# Patient Record
Sex: FEMALE | Race: WHITE | Employment: OTHER | URBAN - METROPOLITAN AREA
[De-identification: names, ages, dates, MRNs, and addresses within clinical notes are randomized per-mention and may not be internally consistent; named-entity substitution may affect disease eponyms.]

---

## 2017-07-17 ENCOUNTER — OFFICE VISIT (OUTPATIENT)
Dept: DERMATOLOGY | Age: 67
End: 2017-07-17

## 2017-07-17 DIAGNOSIS — D48.5 NEOPLASM OF UNCERTAIN BEHAVIOR OF SKIN: ICD-10-CM

## 2017-07-17 DIAGNOSIS — D22.9 MULTIPLE NEVI: ICD-10-CM

## 2017-07-17 DIAGNOSIS — Z85.820 HISTORY OF MELANOMA: Primary | ICD-10-CM

## 2017-07-17 DIAGNOSIS — Z85.828 HISTORY OF SKIN CANCER: ICD-10-CM

## 2017-07-17 DIAGNOSIS — L57.0 AK (ACTINIC KERATOSIS): ICD-10-CM

## 2017-07-17 PROCEDURE — 17003 DESTRUCT PREMALG LES 2-14: CPT | Performed by: DERMATOLOGY

## 2017-07-17 PROCEDURE — 11100 PR BIOPSY OF SKIN LESION: CPT | Performed by: DERMATOLOGY

## 2017-07-17 PROCEDURE — 17000 DESTRUCT PREMALG LESION: CPT | Performed by: DERMATOLOGY

## 2017-07-17 PROCEDURE — G8427 DOCREV CUR MEDS BY ELIG CLIN: HCPCS | Performed by: DERMATOLOGY

## 2017-07-17 PROCEDURE — G8421 BMI NOT CALCULATED: HCPCS | Performed by: DERMATOLOGY

## 2017-07-17 PROCEDURE — 1036F TOBACCO NON-USER: CPT | Performed by: DERMATOLOGY

## 2017-07-17 PROCEDURE — 1123F ACP DISCUSS/DSCN MKR DOCD: CPT | Performed by: DERMATOLOGY

## 2017-07-17 PROCEDURE — G8400 PT W/DXA NO RESULTS DOC: HCPCS | Performed by: DERMATOLOGY

## 2017-07-17 PROCEDURE — 1090F PRES/ABSN URINE INCON ASSESS: CPT | Performed by: DERMATOLOGY

## 2017-07-17 PROCEDURE — 3014F SCREEN MAMMO DOC REV: CPT | Performed by: DERMATOLOGY

## 2017-07-17 PROCEDURE — 4040F PNEUMOC VAC/ADMIN/RCVD: CPT | Performed by: DERMATOLOGY

## 2017-07-17 PROCEDURE — 3017F COLORECTAL CA SCREEN DOC REV: CPT | Performed by: DERMATOLOGY

## 2017-07-17 PROCEDURE — 99213 OFFICE O/P EST LOW 20 MIN: CPT | Performed by: DERMATOLOGY

## 2017-07-21 ENCOUNTER — TELEPHONE (OUTPATIENT)
Dept: DERMATOLOGY | Age: 67
End: 2017-07-21

## 2017-09-05 ENCOUNTER — TELEPHONE (OUTPATIENT)
Dept: DERMATOLOGY | Age: 67
End: 2017-09-05

## 2017-09-07 ENCOUNTER — OFFICE VISIT (OUTPATIENT)
Dept: DERMATOLOGY | Age: 67
End: 2017-09-07

## 2017-09-07 DIAGNOSIS — B07.0 PLANTAR WART OF LEFT FOOT: Primary | ICD-10-CM

## 2017-09-07 PROCEDURE — 1090F PRES/ABSN URINE INCON ASSESS: CPT | Performed by: DERMATOLOGY

## 2017-09-07 PROCEDURE — 3014F SCREEN MAMMO DOC REV: CPT | Performed by: DERMATOLOGY

## 2017-09-07 PROCEDURE — 1123F ACP DISCUSS/DSCN MKR DOCD: CPT | Performed by: DERMATOLOGY

## 2017-09-07 PROCEDURE — 1036F TOBACCO NON-USER: CPT | Performed by: DERMATOLOGY

## 2017-09-07 PROCEDURE — G8427 DOCREV CUR MEDS BY ELIG CLIN: HCPCS | Performed by: DERMATOLOGY

## 2017-09-07 PROCEDURE — G8400 PT W/DXA NO RESULTS DOC: HCPCS | Performed by: DERMATOLOGY

## 2017-09-07 PROCEDURE — 99212 OFFICE O/P EST SF 10 MIN: CPT | Performed by: DERMATOLOGY

## 2017-09-07 PROCEDURE — G8421 BMI NOT CALCULATED: HCPCS | Performed by: DERMATOLOGY

## 2017-09-07 PROCEDURE — 4040F PNEUMOC VAC/ADMIN/RCVD: CPT | Performed by: DERMATOLOGY

## 2017-09-07 PROCEDURE — 3017F COLORECTAL CA SCREEN DOC REV: CPT | Performed by: DERMATOLOGY

## 2019-09-09 ENCOUNTER — OFFICE VISIT (OUTPATIENT)
Dept: DERMATOLOGY | Age: 69
End: 2019-09-09
Payer: MEDICARE

## 2019-09-09 DIAGNOSIS — D48.5 NEOPLASM OF UNCERTAIN BEHAVIOR OF SKIN: ICD-10-CM

## 2019-09-09 DIAGNOSIS — D22.9 MULTIPLE NEVI: ICD-10-CM

## 2019-09-09 DIAGNOSIS — L57.0 AK (ACTINIC KERATOSIS): ICD-10-CM

## 2019-09-09 DIAGNOSIS — Z85.820 HISTORY OF MELANOMA: Primary | ICD-10-CM

## 2019-09-09 DIAGNOSIS — Z80.8 FAMILY HISTORY OF MALIGNANT MELANOMA: ICD-10-CM

## 2019-09-09 DIAGNOSIS — Z85.828 HISTORY OF NONMELANOMA SKIN CANCER: ICD-10-CM

## 2019-09-09 PROCEDURE — 1036F TOBACCO NON-USER: CPT | Performed by: DERMATOLOGY

## 2019-09-09 PROCEDURE — G8427 DOCREV CUR MEDS BY ELIG CLIN: HCPCS | Performed by: DERMATOLOGY

## 2019-09-09 PROCEDURE — 1123F ACP DISCUSS/DSCN MKR DOCD: CPT | Performed by: DERMATOLOGY

## 2019-09-09 PROCEDURE — G8421 BMI NOT CALCULATED: HCPCS | Performed by: DERMATOLOGY

## 2019-09-09 PROCEDURE — 11102 TANGNTL BX SKIN SINGLE LES: CPT | Performed by: DERMATOLOGY

## 2019-09-09 PROCEDURE — 17003 DESTRUCT PREMALG LES 2-14: CPT | Performed by: DERMATOLOGY

## 2019-09-09 PROCEDURE — 4040F PNEUMOC VAC/ADMIN/RCVD: CPT | Performed by: DERMATOLOGY

## 2019-09-09 PROCEDURE — 17000 DESTRUCT PREMALG LESION: CPT | Performed by: DERMATOLOGY

## 2019-09-09 PROCEDURE — 99213 OFFICE O/P EST LOW 20 MIN: CPT | Performed by: DERMATOLOGY

## 2019-09-09 PROCEDURE — 3017F COLORECTAL CA SCREEN DOC REV: CPT | Performed by: DERMATOLOGY

## 2019-09-09 PROCEDURE — 1090F PRES/ABSN URINE INCON ASSESS: CPT | Performed by: DERMATOLOGY

## 2019-09-09 PROCEDURE — G8400 PT W/DXA NO RESULTS DOC: HCPCS | Performed by: DERMATOLOGY

## 2019-09-09 RX ORDER — UBIDECARENONE 100 MG
300 CAPSULE ORAL DAILY
COMMUNITY

## 2019-09-09 RX ORDER — CLOPIDOGREL BISULFATE 75 MG/1
TABLET ORAL
Refills: 1 | COMMUNITY
Start: 2019-07-03 | End: 2020-09-15

## 2019-09-09 RX ORDER — LOSARTAN POTASSIUM 25 MG/1
TABLET ORAL
COMMUNITY
Start: 2017-12-27 | End: 2019-09-09 | Stop reason: CLARIF

## 2019-09-09 RX ORDER — OXYBUTYNIN CHLORIDE 5 MG/1
TABLET ORAL
Refills: 2 | COMMUNITY
Start: 2019-08-30

## 2019-09-09 RX ORDER — ATORVASTATIN CALCIUM 40 MG/1
40 TABLET, FILM COATED ORAL
COMMUNITY

## 2019-09-09 NOTE — PATIENT INSTRUCTIONS
Biopsy Wound Care Instructions    · Keep the bandage in place for 24 hours. · Cleanse the wound with mild soapy water daily   Gently dry the area.  Apply Vaseline or petroleum jelly to the wound using a cotton tipped applicator.  Cover with a clean bandage.  Repeat this process until the biopsy site is healed.  If you had stitches placed, continue treating the site until the stitches are removed. Remember to make an appointment to return to have your stitches removed by our staff.  You may shower and bathe as usual.       ** Biopsy results generally take around 7 business days to come back. If you have not heard from us by then, please call the office at [phone] between 8AM and 4PM Monday through Friday. Cryosurgery (Freezing) Wound Care Instructions    AFTER THE PROCEDURE:    You will notice swelling and redness around the site. This is normal.    You may experience a sharp or sore feeling for the next several days. For this discomfort, you may take acetaminophen (Tylenol©).  A blister may develop at the treated area, sometimes as soon as by the end of the day. After several days, the blister will subside and a scab will form.  If the area is bumped or traumatized during the first few days following freezing, you may develop bleeding into the blister, forming a blood blister. This is nothing to be alarmed about.  If the blister is tense, uncomfortable, or much larger than the site that was frozen, you may pop the blister along its edge with a sterile needle (boiled, heated under a flame, or cleaned with alcohol) to allow the fluid to drain out. If the blister does not bother you, no treatment is needed.  Do NOT peel off the top of the blister roof. It will act as a dressing on top of your wound. WOUND CARE:    You may shower or bathe as usual, but avoid scrubbing the areas that have been frozen.      Cleanse the site twice a day with mild soapy water, and then apply a

## 2019-09-12 LAB — DERMATOLOGY PATHOLOGY REPORT: ABNORMAL

## 2019-09-25 ENCOUNTER — TELEPHONE (OUTPATIENT)
Dept: DERMATOLOGY | Age: 69
End: 2019-09-25

## 2019-09-26 ENCOUNTER — PROCEDURE VISIT (OUTPATIENT)
Dept: DERMATOLOGY | Age: 69
End: 2019-09-26
Payer: MEDICARE

## 2019-09-26 DIAGNOSIS — C44.519 BASAL CELL CARCINOMA (BCC) OF BACK: Primary | ICD-10-CM

## 2019-09-26 PROCEDURE — 12032 INTMD RPR S/A/T/EXT 2.6-7.5: CPT | Performed by: DERMATOLOGY

## 2019-09-26 PROCEDURE — 11602 EXC TR-EXT MAL+MARG 1.1-2 CM: CPT | Performed by: DERMATOLOGY

## 2019-09-26 NOTE — PROGRESS NOTES
[facility] Dermatology  Vinod Andrews MD  [phone]      Kristy Max  1950    68 y.o. female     Date of Visit: 9/26/2019    Chief Complaint: Basal cell-back  Chief Complaint   Patient presents with    Procedure     Excision: Back-BCC/NT     Last seen: Few weeks ago  *heart stent 3-2019 and on plavix    History of Present Illness:  *living in [facility]. Gracia Smiemmanuel full time now; going back to [facility] Oct 9    Here for excision of a nodule BCC on the back that was biopsied at her last visit a few weeks ago. No problems since then. hx of MM on the posterior neck (see below) -full skin check at her last visit a few weeks ago. Followed with Dr. Belkis Wellington as well but he released her after her 6-2016 visit - had imaging done - PET and MRI negative for mets 6-2016.  + family hx of melanoma (brother and sister)      Dermatology History   hx MM, posterior neck. 4.5 mm at least, level IV, ulceration identified. Deep margin involved, mitotic index 10/mm2. No clinical evidence of regional or distant metastatic disease. Nodes negative, stage IIb. Excised 2-2012 by Dr. Belkis Wellington  3-0197 imaging negative, PET and MRI neg 5-2014    edc of pig BCC on L lower back - 2012  S/p 3 weeks of efudex for HAK with focal SCC in situ on the L cheek   BCC - R ala - Mohs 6-2015    Review of Systems: see other ROS above  Gen: Feels well, good sense of health. Heme: No bleeding problems    Past Medical History, Family History, Surgical History, Medications and Allergies reviewed.     Past Medical History:   Diagnosis Date    Basal cell carcinoma of right alar groove 6/9/2015    History of melanoma     History of poliomyelitis        Past Surgical History:   Procedure Laterality Date    CARDIAC SURGERY      CHOLECYSTECTOMY      SKIN CANCER EXCISION         Outpatient Medications Marked as Taking for the 9/26/19 encounter (Procedure visit) with Graeme Cox MD   Medication Sig Dispense Refill    atorvastatin

## 2019-09-30 LAB — DERMATOLOGY PATHOLOGY REPORT: ABNORMAL

## 2020-09-15 ENCOUNTER — OFFICE VISIT (OUTPATIENT)
Dept: DERMATOLOGY | Age: 70
End: 2020-09-15
Payer: MEDICARE

## 2020-09-15 VITALS — TEMPERATURE: 97.2 F

## 2020-09-15 PROCEDURE — G8427 DOCREV CUR MEDS BY ELIG CLIN: HCPCS | Performed by: DERMATOLOGY

## 2020-09-15 PROCEDURE — 1123F ACP DISCUSS/DSCN MKR DOCD: CPT | Performed by: DERMATOLOGY

## 2020-09-15 PROCEDURE — 11103 TANGNTL BX SKIN EA SEP/ADDL: CPT | Performed by: DERMATOLOGY

## 2020-09-15 PROCEDURE — 11102 TANGNTL BX SKIN SINGLE LES: CPT | Performed by: DERMATOLOGY

## 2020-09-15 PROCEDURE — 11312 SHAVE SKIN LESION 1.1-2.0 CM: CPT | Performed by: DERMATOLOGY

## 2020-09-15 PROCEDURE — G8400 PT W/DXA NO RESULTS DOC: HCPCS | Performed by: DERMATOLOGY

## 2020-09-15 PROCEDURE — G8419 CALC BMI OUT NRM PARAM NOF/U: HCPCS | Performed by: DERMATOLOGY

## 2020-09-15 PROCEDURE — 3017F COLORECTAL CA SCREEN DOC REV: CPT | Performed by: DERMATOLOGY

## 2020-09-15 PROCEDURE — 4040F PNEUMOC VAC/ADMIN/RCVD: CPT | Performed by: DERMATOLOGY

## 2020-09-15 PROCEDURE — 99214 OFFICE O/P EST MOD 30 MIN: CPT | Performed by: DERMATOLOGY

## 2020-09-15 PROCEDURE — 1090F PRES/ABSN URINE INCON ASSESS: CPT | Performed by: DERMATOLOGY

## 2020-09-15 PROCEDURE — 1036F TOBACCO NON-USER: CPT | Performed by: DERMATOLOGY

## 2020-09-15 NOTE — PROGRESS NOTES
[facility] Dermatology  Cata Sarmiento MD  [phone]      Caryle Armor  1950    65 y.o. female     Date of Visit: 9/15/2020    Chief Complaint: f/u melanoma, lesions  Chief Complaint   Patient presents with    Skin Exam     Last seen: 9-2019  *heart stent 3-2019 and on plavix    History of Present Illness:  *living in [facility]. Osbaldo Javed full time now; going back to [facility] Oct 9; sees a derm named Dr. Jennifer Sidhu in [facility]    1. hx of MM on the posterior neck (see below) - here for full skin check; no new lesions or concerns with scar/site; no LAD, no HA, no F/C/NS. Followed with Dr. Viky Rousseau as well but he released her after her 6-2016 visit - had imaging done - PET and MRI negative for mets 6-2016.  + family hx of melanoma (brother and sister)    2. Mult nevi - no other new concerns or changing lesions   3. Hx of NMSC and AK's - no concerns with previous sites. She has a few new rough lesions noted below    4. She has a few tiny scattered rough lesions on the face. Hx of AK's. 5. She has dry itchy skin on the FH and near the brows. Was given a medicatiomn by her [place] derm but never used it and doesn't recall the name. 6. She has a few concerning lesions - L shin, L upper back x 2.    7. She has a brown lesion on the R FH that she notes is growing and can itch. Dermatology History   hx MM, posterior neck. 4.5 mm at least, level IV, ulceration identified. Deep margin involved, mitotic index 10/mm2. No clinical evidence of regional or distant metastatic disease. Nodes negative, stage IIb. Excised 2-2012 by Dr. Viky Rousseau  0-8080 imaging negative, PET and MRI neg 5-2014    edc of pig BCC on L lower back - 2012  S/p 3 weeks of efudex for HAK with focal SCC in situ on the L cheek   BCC - R ala - Mohs 6-2015  BCC - central back, L of midline- excision 9-2019    Review of Systems: see other ROS above  Gen: Feels well, good sense of health. Had lost 30 lbs intentionally (has gained most back).   Skin: No changing moles or lesions   Neuro: no HA  Heme/Lymph: no LAD    Past Medical History, Family History, Surgical History, Medications and Allergies reviewed. Past Medical History:   Diagnosis Date    Basal cell carcinoma of right alar groove 6/9/2015    History of melanoma     History of poliomyelitis        Past Surgical History:   Procedure Laterality Date    CARDIAC SURGERY      CHOLECYSTECTOMY      SKIN CANCER EXCISION         Outpatient Medications Marked as Taking for the 9/15/20 encounter (Office Visit) with Minerva Jacobo MD   Medication Sig Dispense Refill    atorvastatin (LIPITOR) 40 MG tablet Take 40 mg by mouth      oxybutynin (DITROPAN) 5 MG tablet TAKE 1 TABLET BY MOUTH EVERY DAY  2    coenzyme Q10 100 MG CAPS capsule Take 300 mg by mouth daily      aspirin 81 MG tablet Take 81 mg by mouth daily      losartan (COZAAR) 25 MG tablet          Allergies   Allergen Reactions    Celebrex [Celecoxib] Rash     Head to toe rash         Physical Examination     Gen, NAD  The following were examined and determined to be normal: Psych/Neuro, Scalp/hair, Conjunctivae/eyelids, Gums/teeth/lips, Neck, Abdomen, LUE, RLE, Nails/digits and Groin/buttock, lymph, chest, R arm, LLE   The following were examined and determined to be abnormal:  Face, back  1. Scar clear; no palpable LAD; she is dark tan   2. trunk and extremities with scattered brown macules and papules   3. Scars clear  4. Face - with few pinpoint erythematous roughly scaled macules  5. Lower FH/glabella with ill-defined flaky faintly erythematous patches  6. L shin - pinkish-brown waxy macule  L upper back - site A (Lateral) - bright pink sclerotic thin papule  L upper back - site B - (medial) - bright pink macule  7. R FH with dark brown waxy papule    Assessment and Plan     1.  Hx of melanoma, no signs recurrence and family hx of melanoma, no signs of recurrence  - educ re ABCD's of MM   educ and encouraged better sun protection encouraged skin check in 6 mos (sooner if indicated), self checks monthly    2. Benign-appearing nevi  - ed as above    3. Hx of NMSC, no signs recurrence  - ed as above    4. AK's - face - few tiny - deferred trx today    5. Mild seb derm - glabella/lower FH - HC 2.5 bid prn flares; ed se/misuse  6. L shin - r/o SK vs atypical  L upper back - site A (Lateral) - r/o BCC  L upper back - site B - (medial) - r/o BCC  - 3 Shave biopsy performed after verbal consent obtained. Patient educated regarding risk of bleeding, infection, scar and educated on wound care. Skin cleansed with alcohol pad and site anesthetized with lido + epi. Aluminum chloride applied to site for hemostasis. Petrolatum ointment and bandage applied. Specimen bottle labeled with patient information and site and specimen sent to dermpath. 7. R FH - pig SK - 1.2 cm  - Shave removal performed after verbal consent obtained. Patient educated regarding risk of bleeding, infection, scar and educated on wound care. Skin cleansed with alcohol pad and site anesthetized with lido + epi. Aluminum chloride applied to site for hemostasis. Petrolatum ointment and bandage applied. Specimen bottle labeled with patient information and site and specimen sent to dermpath.

## 2020-09-15 NOTE — PROGRESS NOTES
[facility] [facility] time__0720__________        Surgery time____0850________    Take the following medications with a sip of water:     Do not eat or drink anything after 12:00 midnight prior to your surgery. This includes water chewing gum, mints and ice chips. You may brush your teeth and gargle the morning of your surgery, but do not swallow the water     Please see your family doctor/pediatrician for a history and physical and/or concerning medications. 9/15/20 Dr. Alla Zuleta    Bring any test results/reports from your physicians office. If you are under the care of a heart doctor or specialist doctor, please be aware that you may be asked to them for clearance    You may be asked to stop blood thinners such as Coumadin, Plavix, Fragmin, Lovenox, etc., or any anti-inflammatories such as:  Aspirin, Ibuprofen, Advil, Naproxen prior to your surgery. We also ask that you stop any OTC medications such as fish oil, vitamin E, glucosamine, garlic, Multivitamins, COQ 10, etc.    We ask that you do not smoke 24 hours prior to surgery  We ask that you do not  drink any alcoholic beverages 24 hours prior to surgery     You must make arrangements for a responsible adult to take you home after your surgery. For your safety you will not be allowed to leave alone or drive yourself home. Your surgery will be cancelled if you do not have a ride home. Also for your safety, it is strongly suggested that someone stay with you the first 24 hours after your surgery. A parent or legal guardian must accompany a child scheduled for surgery and plan to stay at the hospital until the child is discharged. Please do not bring other children with you. For your comfort, please wear simple loose fitting clothing to the hospital.  Please do not bring valuables. Wear short sleeve button down shirt or loose fitting shirt. Bting eye drops or ointment.     Do not wear any make-up or nail polish on your fingers or toes      For your safety, please do not wear any jewelry or body piercing's on the day of surgery. All jewelry must be removed. If you have dentures, they will be removed before going to operating room. For your convenience, we will provide you with a container. If you wear contact lenses or glasses, they will be removed, please bring a case for them. If you have a living will and a durable power of  for healthcare, please bring in a copy. As part of our patient safety program to minimize surgical site infections, we ask you to do the following:    · Please notify your surgeon if you develop any illness between         now and the  day of your surgery. · This includes a cough, cold, fever, sore throat, nausea,         or vomiting, and diarrhea, etc.  ·  Please notify your surgeon if you experience dizziness, shortness         of breath or blurred vision between now and the time of your surgery. Do not shave your operative site 96 hours prior to surgery. For face and neck surgery, men may use an electric razor 48 hours   prior to surgery. You may shower the night before surgery or the morning of   your surgery with an antibacterial soap. You will need to bring a photo ID and insurance card    [facility] has an onsite pharmacy, would you like to utilize our pharmacy     If you will be staying overnight and use a C-pap machine, please bring   your C-pap to hospital     Our goal is to provide you with excellent care, therefore, visitors will be limited to two(2) in the room at a time so that we may focus on providing this care for you. Please contact pre-admission testing if you have any further questions. [facility] phone number:  [phone]  Please note these are generalized instructions for all surgical cases, you may be provided with more specific instructions according to your surgery.

## 2020-09-15 NOTE — PATIENT INSTRUCTIONS

## 2020-09-18 LAB — DERMATOLOGY PATHOLOGY REPORT: ABNORMAL

## 2020-09-20 ENCOUNTER — PREP FOR PROCEDURE (OUTPATIENT)
Dept: OPHTHALMOLOGY | Age: 70
End: 2020-09-20

## 2020-09-20 RX ORDER — PHENYLEPHRINE HYDROCHLORIDE 25 MG/ML
1 SOLUTION/ DROPS OPHTHALMIC SEE ADMIN INSTRUCTIONS
Status: CANCELLED | OUTPATIENT
Start: 2020-09-20

## 2020-09-20 RX ORDER — CYCLOPENTOLATE HYDROCHLORIDE 10 MG/ML
1 SOLUTION/ DROPS OPHTHALMIC SEE ADMIN INSTRUCTIONS
Status: CANCELLED | OUTPATIENT
Start: 2020-09-20

## 2020-09-20 RX ORDER — TETRACAINE HYDROCHLORIDE 5 MG/ML
1 SOLUTION OPHTHALMIC SEE ADMIN INSTRUCTIONS
Status: CANCELLED | OUTPATIENT
Start: 2020-09-20

## 2020-09-20 RX ORDER — SODIUM CHLORIDE 0.9 % (FLUSH) 0.9 %
10 SYRINGE (ML) INJECTION PRN
Status: CANCELLED | OUTPATIENT
Start: 2020-09-20

## 2020-09-20 RX ORDER — SODIUM CHLORIDE 0.9 % (FLUSH) 0.9 %
10 SYRINGE (ML) INJECTION EVERY 12 HOURS SCHEDULED
Status: CANCELLED | OUTPATIENT
Start: 2020-09-20

## 2020-09-20 RX ORDER — CIPROFLOXACIN HYDROCHLORIDE 3.5 MG/ML
1 SOLUTION/ DROPS TOPICAL SEE ADMIN INSTRUCTIONS
Status: CANCELLED | OUTPATIENT
Start: 2020-09-20

## 2020-09-21 ENCOUNTER — ANESTHESIA EVENT (OUTPATIENT)
Dept: SURGERY | Age: 70
End: 2020-09-21
Payer: MEDICARE

## 2020-09-21 NOTE — PROGRESS NOTES
[address]        Pre-Op Phone Call:     Patient Name: Mindy Palomo     No relevant phone numbers on file. Home phone:  [phone]    Surgery Time:    8:50 AM     Arrival Time:  0720     Left extended Message:  NA     Message left with:     Recent change in health status:  No     Advised of transportation/ policy:  Yes     NPO policy reviewed:  Yes pt     Advised to take morning heart/blood pressure medications with sips of water morning of surgery? Yes     Instructed to bring eye drops, photo identification, and insurance card day of surgery? Yes     Advised to wear short sleeved button down shirt (no T-shirt underneath):  Yes     Advised not to wear jewelry, hairpins, or pantyhose day of surgery? Yes     Advised not to wear make-up and to wash face day of surgery?   Yes    Remarks:        Electronically signed by:  Cain Paul RN at 9/21/2020 2:03 PM

## 2020-09-22 ENCOUNTER — ANESTHESIA (OUTPATIENT)
Dept: SURGERY | Age: 70
End: 2020-09-22
Payer: MEDICARE

## 2020-09-22 ENCOUNTER — HOSPITAL ENCOUNTER (OUTPATIENT)
Age: 70
Setting detail: OUTPATIENT SURGERY
Discharge: HOME | End: 2020-09-22
Attending: OPHTHALMOLOGY | Admitting: OPHTHALMOLOGY
Payer: MEDICARE

## 2020-09-22 VITALS
BODY MASS INDEX: 33.99 KG/M2 | OXYGEN SATURATION: 97 % | DIASTOLIC BLOOD PRESSURE: 66 MMHG | RESPIRATION RATE: 18 BRPM | TEMPERATURE: 96.5 F | WEIGHT: 180 LBS | HEART RATE: 71 BPM | HEIGHT: 61 IN | SYSTOLIC BLOOD PRESSURE: 155 MMHG

## 2020-09-22 VITALS — SYSTOLIC BLOOD PRESSURE: 184 MMHG | OXYGEN SATURATION: 100 % | DIASTOLIC BLOOD PRESSURE: 79 MMHG

## 2020-09-22 PROCEDURE — 2500000003 HC RX 250 WO HCPCS: Performed by: OPHTHALMOLOGY

## 2020-09-22 PROCEDURE — 2580000003 HC RX 258: Performed by: ANESTHESIOLOGY

## 2020-09-22 PROCEDURE — 7100000010 HC PHASE II RECOVERY - FIRST 15 MIN: Performed by: OPHTHALMOLOGY

## 2020-09-22 PROCEDURE — 6360000002 HC RX W HCPCS: Performed by: OPHTHALMOLOGY

## 2020-09-22 PROCEDURE — 3600000004 HC SURGERY LEVEL 4 BASE: Performed by: OPHTHALMOLOGY

## 2020-09-22 PROCEDURE — V2632 POST CHMBR INTRAOCULAR LENS: HCPCS | Performed by: OPHTHALMOLOGY

## 2020-09-22 PROCEDURE — 6360000002 HC RX W HCPCS: Performed by: NURSE ANESTHETIST, CERTIFIED REGISTERED

## 2020-09-22 PROCEDURE — 3600000014 HC SURGERY LEVEL 4 ADDTL 15MIN: Performed by: OPHTHALMOLOGY

## 2020-09-22 PROCEDURE — 6370000000 HC RX 637 (ALT 250 FOR IP): Performed by: OPHTHALMOLOGY

## 2020-09-22 PROCEDURE — 3700000001 HC ADD 15 MINUTES (ANESTHESIA): Performed by: OPHTHALMOLOGY

## 2020-09-22 PROCEDURE — 2720000010 HC SURG SUPPLY STERILE: Performed by: OPHTHALMOLOGY

## 2020-09-22 PROCEDURE — 3700000000 HC ANESTHESIA ATTENDED CARE: Performed by: OPHTHALMOLOGY

## 2020-09-22 PROCEDURE — 2709999900 HC NON-CHARGEABLE SUPPLY: Performed by: OPHTHALMOLOGY

## 2020-09-22 PROCEDURE — 2580000003 HC RX 258: Performed by: NURSE ANESTHETIST, CERTIFIED REGISTERED

## 2020-09-22 DEVICE — IMPLANTABLE DEVICE: Type: IMPLANTABLE DEVICE | Site: EYE | Status: FUNCTIONAL

## 2020-09-22 RX ORDER — TETRACAINE HYDROCHLORIDE 5 MG/ML
1 SOLUTION OPHTHALMIC SEE ADMIN INSTRUCTIONS
Status: DISCONTINUED | OUTPATIENT
Start: 2020-09-22 | End: 2020-09-22 | Stop reason: HOSPADM

## 2020-09-22 RX ORDER — LABETALOL HYDROCHLORIDE 5 MG/ML
5 INJECTION, SOLUTION INTRAVENOUS EVERY 10 MIN PRN
Status: DISCONTINUED | OUTPATIENT
Start: 2020-09-22 | End: 2020-09-22 | Stop reason: HOSPADM

## 2020-09-22 RX ORDER — SODIUM CHLORIDE 9 MG/ML
INJECTION, SOLUTION INTRAVENOUS CONTINUOUS PRN
Status: DISCONTINUED | OUTPATIENT
Start: 2020-09-22 | End: 2020-09-22 | Stop reason: SDUPTHER

## 2020-09-22 RX ORDER — PROMETHAZINE HYDROCHLORIDE 25 MG/ML
6.25 INJECTION, SOLUTION INTRAMUSCULAR; INTRAVENOUS
Status: DISCONTINUED | OUTPATIENT
Start: 2020-09-22 | End: 2020-09-22 | Stop reason: HOSPADM

## 2020-09-22 RX ORDER — ONDANSETRON 2 MG/ML
4 INJECTION INTRAMUSCULAR; INTRAVENOUS
Status: DISCONTINUED | OUTPATIENT
Start: 2020-09-22 | End: 2020-09-22 | Stop reason: HOSPADM

## 2020-09-22 RX ORDER — SODIUM CHLORIDE 9 MG/ML
INJECTION, SOLUTION INTRAVENOUS CONTINUOUS
Status: DISCONTINUED | OUTPATIENT
Start: 2020-09-22 | End: 2020-09-22 | Stop reason: HOSPADM

## 2020-09-22 RX ORDER — CYCLOPENTOLATE HYDROCHLORIDE 10 MG/ML
1 SOLUTION/ DROPS OPHTHALMIC SEE ADMIN INSTRUCTIONS
Status: COMPLETED | OUTPATIENT
Start: 2020-09-22 | End: 2020-09-22

## 2020-09-22 RX ORDER — BRIMONIDINE TARTRATE 2 MG/ML
SOLUTION/ DROPS OPHTHALMIC
Status: COMPLETED | OUTPATIENT
Start: 2020-09-22 | End: 2020-09-22

## 2020-09-22 RX ORDER — SODIUM CHLORIDE 0.9 % (FLUSH) 0.9 %
10 SYRINGE (ML) INJECTION EVERY 12 HOURS SCHEDULED
Status: DISCONTINUED | OUTPATIENT
Start: 2020-09-22 | End: 2020-09-22 | Stop reason: HOSPADM

## 2020-09-22 RX ORDER — SODIUM CHLORIDE 0.9 % (FLUSH) 0.9 %
10 SYRINGE (ML) INJECTION PRN
Status: DISCONTINUED | OUTPATIENT
Start: 2020-09-22 | End: 2020-09-22 | Stop reason: SDUPTHER

## 2020-09-22 RX ORDER — PHENYLEPHRINE HCL 2.5 %
1 DROPS OPHTHALMIC (EYE) SEE ADMIN INSTRUCTIONS
Status: COMPLETED | OUTPATIENT
Start: 2020-09-22 | End: 2020-09-22

## 2020-09-22 RX ORDER — SODIUM CHLORIDE 0.9 % (FLUSH) 0.9 %
10 SYRINGE (ML) INJECTION EVERY 12 HOURS SCHEDULED
Status: DISCONTINUED | OUTPATIENT
Start: 2020-09-22 | End: 2020-09-22 | Stop reason: SDUPTHER

## 2020-09-22 RX ORDER — CIPROFLOXACIN HYDROCHLORIDE 3.5 MG/ML
SOLUTION/ DROPS TOPICAL
Status: COMPLETED | OUTPATIENT
Start: 2020-09-22 | End: 2020-09-22

## 2020-09-22 RX ORDER — MIDAZOLAM HYDROCHLORIDE 1 MG/ML
INJECTION INTRAMUSCULAR; INTRAVENOUS PRN
Status: DISCONTINUED | OUTPATIENT
Start: 2020-09-22 | End: 2020-09-22 | Stop reason: SDUPTHER

## 2020-09-22 RX ORDER — TETRACAINE HYDROCHLORIDE 5 MG/ML
SOLUTION OPHTHALMIC
Status: COMPLETED | OUTPATIENT
Start: 2020-09-22 | End: 2020-09-22

## 2020-09-22 RX ORDER — CIPROFLOXACIN HYDROCHLORIDE 3.5 MG/ML
1 SOLUTION/ DROPS TOPICAL
Status: COMPLETED | OUTPATIENT
Start: 2020-09-22 | End: 2020-09-22

## 2020-09-22 RX ORDER — SODIUM CHLORIDE 0.9 % (FLUSH) 0.9 %
10 SYRINGE (ML) INJECTION PRN
Status: DISCONTINUED | OUTPATIENT
Start: 2020-09-22 | End: 2020-09-22 | Stop reason: HOSPADM

## 2020-09-22 RX ADMIN — TETRACAINE HYDROCHLORIDE 1 DROP: 5 SOLUTION OPHTHALMIC at 07:35

## 2020-09-22 RX ADMIN — SODIUM CHLORIDE: 9 INJECTION, SOLUTION INTRAVENOUS at 07:48

## 2020-09-22 RX ADMIN — CYCLOPENTOLATE HYDROCHLORIDE 1 DROP: 10 SOLUTION/ DROPS OPHTHALMIC at 07:40

## 2020-09-22 RX ADMIN — MIDAZOLAM 1 MG: 1 INJECTION INTRAMUSCULAR; INTRAVENOUS at 09:18

## 2020-09-22 RX ADMIN — PHENYLEPHRINE HYDROCHLORIDE 1 DROP: 25 SOLUTION/ DROPS OPHTHALMIC at 07:40

## 2020-09-22 RX ADMIN — SODIUM CHLORIDE: 9 INJECTION, SOLUTION INTRAVENOUS at 07:31

## 2020-09-22 RX ADMIN — PHENYLEPHRINE HYDROCHLORIDE 1 DROP: 25 SOLUTION/ DROPS OPHTHALMIC at 07:35

## 2020-09-22 RX ADMIN — CYCLOPENTOLATE HYDROCHLORIDE 1 DROP: 10 SOLUTION/ DROPS OPHTHALMIC at 07:35

## 2020-09-22 RX ADMIN — PHENYLEPHRINE HYDROCHLORIDE 1 DROP: 25 SOLUTION/ DROPS OPHTHALMIC at 07:48

## 2020-09-22 RX ADMIN — CIPROFLOXACIN 1 DROP: 3 SOLUTION OPHTHALMIC at 07:35

## 2020-09-22 RX ADMIN — CYCLOPENTOLATE HYDROCHLORIDE 1 DROP: 10 SOLUTION/ DROPS OPHTHALMIC at 07:48

## 2020-09-22 RX ADMIN — MIDAZOLAM 1 MG: 1 INJECTION INTRAMUSCULAR; INTRAVENOUS at 09:16

## 2020-09-22 RX ADMIN — CIPROFLOXACIN 1 DROP: 3 SOLUTION OPHTHALMIC at 07:40

## 2020-09-22 ASSESSMENT — PAIN SCALES - GENERAL
PAINLEVEL_OUTOF10: 0
PAINLEVEL_OUTOF10: 0

## 2020-09-22 ASSESSMENT — PAIN - FUNCTIONAL ASSESSMENT: PAIN_FUNCTIONAL_ASSESSMENT: 0-10

## 2020-09-22 NOTE — ANESTHESIA POSTPROCEDURE EVALUATION
[facility] Department of Anesthesiology  Post-Anesthesia Note       Name:  Rosalba Gee                                  Age:  65 y.o. MRN:  [id number]     Last Vitals & Oxygen Saturation: BP (!) 155/66   Pulse 71   Temp 96.5 °F (35.8 °C) (Temporal)   Resp 18   Ht 5' 1\" (1.549 m)   Wt 180 lb (81.6 kg)   SpO2 97%   BMI 34.01 kg/m²   Patient Vitals for the past 4 hrs:   BP Temp Temp src Pulse Resp SpO2 Height Weight   09/22/20 0951 (!) 155/66 96.5 °F (35.8 °C) Temporal 71 18 97 % -- --   09/22/20 0949 (!) 152/67 97 °F (36.1 °C) Temporal 73 20 -- -- --   09/22/20 0741 (!) 157/59 97.3 °F (36.3 °C) Temporal 76 16 97 % 5' 1\" (1.549 m) 180 lb (81.6 kg)       Level of consciousness:  Awake, alert    Respiratory: Respirations easy, no distress. Stable. Cardiovascular: Hemodynamically stable. Hydration: Adequate. PONV: Adequately managed. Post-op pain: Adequately controlled. Post-op assessment: Tolerated anesthetic well without complication. Complications:  None.     Tito Kendrick MD  September 22, 2020   10:09 AM

## 2020-09-22 NOTE — PROGRESS NOTES
Pt sent home with written d/c instructions, implant card, and tape and shield (per patient request). VSS, pt states that she is ready to go home.

## 2020-09-22 NOTE — OP NOTE
bag. Provisc was removed from posterior to the implant and anterior to the implant by using a Stefan irrigation/aspiration hand piece. 0.2 ml Cefuroxime was placed in the anterior chamber. The corneal incision was checked and the incision was watertight without suture. The wire lid speculum was removed and the patient received topical Ciprofloxacin, and Alphagan P drops. At the conclusion of the procedure, the cornea was clear and the anterior chamber was deep, the pupil was round, and the implant was in good position. The patient tolerated the procedure well and was returned to the recovery room in good condition to be seen for follow-up the next day. ATTENDING ATTESTATION: I was present and scrubbed for the entire procedure.       ELECTRONICALLY SIGNED BY: Saba Vora, 9/22/2020 9:45 AM

## 2020-09-22 NOTE — H&P
Date of Surgery Update:  Saroj An was seen, history and physical examination reviewed, and patient examined by me today. There have been no significant clinical changes since the completion of the previous history and physical.    The risk, benefits, and alternatives of the proposed procedure have been explained to the patient (or appropriate guardian) and understanding verbalized. All questions answered. Patient wishes to proceed.     Electronically signed by: Tessy Herndon MD,9/22/2020,8:21 AM

## 2020-09-22 NOTE — ANESTHESIA PRE PROCEDURE
[facility] Department of Anesthesiology  Pre-Anesthesia Evaluation/Consultation       Name:  Lamar Hernandez  : 1950  Age:  65 y.o. MRN:  [id number]  Date: 2020           Surgeon: Surgeon(s):  Anais Sanchez MD    Procedure: Procedure(s):  LEFT EYE Phacoemulsification with intraocular lens implant     Allergies   Allergen Reactions    Celebrex [Celecoxib] Rash     Head to toe rash     Patient Active Problem List   Diagnosis    Basal cell carcinoma of right alar groove    Visit for wound check    Visit for suture removal     Past Medical History:   Diagnosis Date    Basal cell carcinoma of right alar groove 2015    History of melanoma     History of poliomyelitis     Hyperlipidemia     Hypertension      Past Surgical History:   Procedure Laterality Date    CARDIAC SURGERY      as child     CHOLECYSTECTOMY      CORONARY ANGIOPLASTY WITH STENT PLACEMENT      SKIN CANCER EXCISION       Social History     Tobacco Use    Smoking status: Never Smoker    Smokeless tobacco: Never Used   Substance Use Topics    Alcohol use: Yes    Drug use: No     Medications  No current facility-administered medications on file prior to encounter.       Current Outpatient Medications on File Prior to Encounter   Medication Sig Dispense Refill    atorvastatin (LIPITOR) 40 MG tablet Take 40 mg by mouth      oxybutynin (DITROPAN) 5 MG tablet TAKE 1 TABLET BY MOUTH EVERY DAY  2    coenzyme Q10 100 MG CAPS capsule Take 300 mg by mouth daily      aspirin 81 MG tablet Take 81 mg by mouth daily      losartan (COZAAR) 25 MG tablet Indications: takes at night        Current Facility-Administered Medications   Medication Dose Route Frequency Provider Last Rate Last Dose    0.9 % sodium chloride infusion   Intravenous Continuous Ziyad Hernandez  mL/hr at 20 0748      sodium chloride flush 0.9 % injection 10 mL  10 mL Intravenous 2 times per day Zahida Randle MD        sodium chloride flush 0.9 % injection 10 mL  10 mL Intravenous PRN Zahida Randle MD        tetracaine (TETRAVISC) 0.5 % ophthalmic solution 1 drop  1 drop Left Eye See Admin Instructions Vangie Hussein MD   1 drop at 20 5832     Facility-Administered Medications Ordered in Other Encounters   Medication Dose Route Frequency Provider Last Rate Last Dose    0.9 % sodium chloride infusion    Continuous PRN Yolanda Mcdonnell APRN - CRNA         Vital Signs (Current)   Vitals:    20   BP: (!) 157/59   Pulse: 76   Resp: 16   Temp: 97.3 °F (36.3 °C)   SpO2: 97%     Vital Signs Statistics (for past 48 hrs)     Temp  Av.3 °F (36.3 °C)  Min: 97.3 °F (36.3 °C)   Min taken time: 20  Max: 97.3 °F (36.3 °C)   Max taken time: 20  Pulse  Av  Min: 76   Min taken time: 20  Max: 68   Max taken time: 20  Resp  Av  Min: 16   Min taken time: 20  Max: 16   Max taken time: 20  BP  Min: 157/59   Min taken time: 20  Max: 157/59   Max taken time: 20  SpO2  Av %  Min: 97 %   Min taken time: 20  Max: 97 %   Max taken time: 20    BP Readings from Last 3 Encounters:   20 (!) 157/59   06/30/15 161/79   06/09/15 158/88     BMI  Body mass index is 34.01 kg/m². Estimated body mass index is 34.01 kg/m² as calculated from the following:    Height as of this encounter: 5' 1\" (1.549 m). Weight as of this encounter: 180 lb (81.6 kg). CBC No results found for: WBC, RBC, HGB, HCT, MCV, RDW, PLT  CMP  No results found for: NA, K, CL, CO2, BUN, CREATININE, GFRAA, AGRATIO, LABGLOM, GLUCOSE, PROT, CALCIUM, BILITOT, ALKPHOS, AST, ALT  BMP  No results found for: NA, K, CL, CO2, BUN, CREATININE, CALCIUM, GFRAA, LABGLOM, GLUCOSE  POCGlucose  No results for input(s): GLUCOSE in the last 72 hours.    Coags  No results found for: PROTIME, INR, APTT  HCG (If Applicable) No results found for: PREGTESTUR, PREGSERUM, HCG, HCGQUANT   ABGs No results found for: PHART, PO2ART, HPJ6SON, DNT7VQO, BEART, B8EQSKLE   Type & Screen (If Applicable)  No results found for: LABABO, LABRH                         BMI: Wt Readings from Last 3 Encounters:       NPO Status:   Date of last liquid consumption: 09/21/20   Time of last liquid consumption: 2300   Date of last solid food consumption: 09/21/20      Time of last solid consumption: 2000       Anesthesia Evaluation  Patient summary reviewed no history of anesthetic complications:   Airway: Mallampati: III  TM distance: >3 FB   Neck ROM: full   Dental:          Pulmonary:Negative Pulmonary ROS and normal exam                               Cardiovascular:  Exercise tolerance: good (>4 METS),   (+) hypertension:, CAD:, CABG/stent (Stent 2018):,         Rhythm: regular  Rate: normal           Beta Blocker:  Not on Beta Blocker         Neuro/Psych:   Negative Neuro/Psych ROS              GI/Hepatic/Renal: Neg GI/Hepatic/Renal ROS            Endo/Other: Negative Endo/Other ROS                    Abdominal:   (+) obese,         Vascular: negative vascular ROS. Anesthesia Plan      MAC     ASA 3       Induction: intravenous. Anesthetic plan and risks discussed with patient. Plan discussed with CRNA. This pre-anesthesia assessment may be used as a history and physical.    DOS STAFF ADDENDUM:    Pt seen and examined, chart reviewed (including anesthesia, drug and allergy history). No interval changes to history and physical examination. Anesthetic plan, risks, benefits, alternatives, and personnel involved discussed with patient. Questions and concerns addressed. Patient(family) verbalized an understanding and agrees to proceed.       Melissa Hernandez MD  September 22, 2020  7:57 AM

## 2020-09-23 ENCOUNTER — TELEPHONE (OUTPATIENT)
Dept: DERMATOLOGY | Age: 70
End: 2020-09-23

## 2020-09-23 NOTE — TELEPHONE ENCOUNTER
Patient is returning call she received yesterday w/ biopsy results. Patient can be reached @ [phone]. Please advise. Thank you!

## 2020-10-01 NOTE — PROGRESS NOTES
[facility] [facility] time___0720_________        Surgery time___0850_________    Take the following medications with a sip of water:     Do not eat or drink anything after 12:00 midnight prior to your surgery. This includes water chewing gum, mints and ice chips. You may brush your teeth and gargle the morning of your surgery, but do not swallow the water     Please see your family doctor/pediatrician for a history and physical and/or concerning medications. H&P 9/15 Dr. Sesar Sparks    Bring any test results/reports from your physicians office. If you are under the care of a heart doctor or specialist doctor, please be aware that you may be asked to them for clearance    You may be asked to stop blood thinners such as Coumadin, Plavix, Fragmin, Lovenox, etc., or any anti-inflammatories such as:  Aspirin, Ibuprofen, Advil, Naproxen prior to your surgery. We also ask that you stop any OTC medications such as fish oil, vitamin E, glucosamine, garlic, Multivitamins, COQ 10, etc.    We ask that you do not smoke 24 hours prior to surgery  We ask that you do not  drink any alcoholic beverages 24 hours prior to surgery     You must make arrangements for a responsible adult to take you home after your surgery. For your safety you will not be allowed to leave alone or drive yourself home. Your surgery will be cancelled if you do not have a ride home. Also for your safety, it is strongly suggested that someone stay with you the first 24 hours after your surgery. A parent or legal guardian must accompany a child scheduled for surgery and plan to stay at the hospital until the child is discharged. Please do not bring other children with you. For your comfort, please wear simple loose fitting clothing to the hospital.  Please do not bring valuables. Wear short sleeve button down shirt or loose fitting shirt. Bring eye drops or ointment.     Do not wear any make-up or nail polish on your fingers or toes      For your safety, please do not wear any jewelry or body piercing's on the day of surgery. All jewelry must be removed. If you have dentures, they will be removed before going to operating room. For your convenience, we will provide you with a container. If you wear contact lenses or glasses, they will be removed, please bring a case for them. If you have a living will and a durable power of  for healthcare, please bring in a copy. As part of our patient safety program to minimize surgical site infections, we ask you to do the following:    · Please notify your surgeon if you develop any illness between         now and the  day of your surgery. · This includes a cough, cold, fever, sore throat, nausea,         or vomiting, and diarrhea, etc.  ·  Please notify your surgeon if you experience dizziness, shortness         of breath or blurred vision between now and the time of your surgery. Do not shave your operative site 96 hours prior to surgery. For face and neck surgery, men may use an electric razor 48 hours   prior to surgery. You may shower the night before surgery or the morning of   your surgery with an antibacterial soap. You will need to bring a photo ID and insurance card    [facility] has an onsite pharmacy, would you like to utilize our pharmacy     If you will be staying overnight and use a C-pap machine, please bring   your C-pap to hospital     Our goal is to provide you with excellent care, therefore, visitors will be limited to two(2) in the room at a time so that we may focus on providing this care for you. Please contact pre-admission testing if you have any further questions. [facility] phone number:  [phone]  Please note these are generalized instructions for all surgical cases, you may be provided with more specific instructions according to your surgery.

## 2020-10-13 ENCOUNTER — PROCEDURE VISIT (OUTPATIENT)
Dept: DERMATOLOGY | Age: 70
End: 2020-10-13
Payer: MEDICARE

## 2020-10-13 VITALS — TEMPERATURE: 97.2 F

## 2020-10-13 PROCEDURE — 11602 EXC TR-EXT MAL+MARG 1.1-2 CM: CPT | Performed by: DERMATOLOGY

## 2020-10-13 PROCEDURE — 12032 INTMD RPR S/A/T/EXT 2.6-7.5: CPT | Performed by: DERMATOLOGY

## 2020-10-13 NOTE — PROGRESS NOTES
encounter (Procedure visit) with Ankur Crow MD   Medication Sig Dispense Refill    hydrocortisone 2.5 % cream Apply topically 2 times daily to the FH prn flares 30 g 1    atorvastatin (LIPITOR) 40 MG tablet Take 40 mg by mouth      oxybutynin (DITROPAN) 5 MG tablet TAKE 1 TABLET BY MOUTH EVERY DAY  2    coenzyme Q10 100 MG CAPS capsule Take 300 mg by mouth daily      aspirin 81 MG tablet Take 81 mg by mouth daily      losartan (COZAAR) 25 MG tablet Indications: takes at night          Allergies   Allergen Reactions    Celebrex [Celecoxib] Rash     Head to toe rash         Physical Examination     Gen, NAD     L upper back - site A (Lateral) - scabbed bx site    Assessment and Plan     1. L upper back - site A - nod BCC  - excision today after risks reviewd and pt consented to procedure  educ risk bleed, infxn, scar   area anesth with lido with epi and prepped in sterile manner   ellipse excised to superficial SQ with 2-3 mm margins  - 1.3 cm  specimen to path   edges undermined and hemostasis achieved with electrodessication   wound closed with layered closure 3-0 deep PDS sutures and dermabond  pressure dressing applied   pt educ re wnd care and suture removal     Final repair - 4.1 cm

## 2020-10-15 ENCOUNTER — TELEPHONE (OUTPATIENT)
Dept: DERMATOLOGY | Age: 70
End: 2020-10-15

## 2020-10-15 NOTE — TELEPHONE ENCOUNTER
Dr Kade Rosenbaum patient  Malvin Cool c/b Fred Little stated  Has a discrepancy on a biopsy they received. They have a left vs right issue.  Pt was seen on 10.13.20  Please c/b to discuss

## 2020-10-16 LAB — DERMATOLOGY PATHOLOGY REPORT: ABNORMAL

## 2020-10-20 NOTE — PROGRESS NOTES
[facility] [facility] time___1120_________        Surgery time_____1250_______    Take the following medications with a sip of water:     Do not eat or drink anything after 12:00 midnight prior to your surgery. Clear liquids until 0800  This includes water chewing gum, mints and ice chips. You may brush your teeth and gargle the morning of your surgery, but do not swallow the water     Please see your family doctor/pediatrician for a history and physical and/or concerning medications. H&P 9/15 Dr. Camila Young will see if Dr. Anton Parker will update    Bring any test results/reports from your physicians office. If you are under the care of a heart doctor or specialist doctor, please be aware that you may be asked to them for clearance    You may be asked to stop blood thinners such as Coumadin, Plavix, Fragmin, Lovenox, etc., or any anti-inflammatories such as:  Aspirin, Ibuprofen, Advil, Naproxen prior to your surgery. We also ask that you stop any OTC medications such as fish oil, vitamin E, glucosamine, garlic, Multivitamins, COQ 10, etc.    We ask that you do not smoke 24 hours prior to surgery  We ask that you do not  drink any alcoholic beverages 24 hours prior to surgery     You must make arrangements for a responsible adult to take you home after your surgery. For your safety you will not be allowed to leave alone or drive yourself home. Your surgery will be cancelled if you do not have a ride home. Also for your safety, it is strongly suggested that someone stay with you the first 24 hours after your surgery. A parent or legal guardian must accompany a child scheduled for surgery and plan to stay at the hospital until the child is discharged. Please do not bring other children with you. For your comfort, please wear simple loose fitting clothing to the hospital.  Please do not bring valuables. Wear short sleeve button down shirt or loose fitting shirt. Bring eye drops or ointment. Do not wear any make-up or nail polish on your fingers or toes      For your safety, please do not wear any jewelry or body piercing's on the day of surgery. All jewelry must be removed. If you have dentures, they will be removed before going to operating room. For your convenience, we will provide you with a container. If you wear contact lenses or glasses, they will be removed, please bring a case for them. If you have a living will and a durable power of  for healthcare, please bring in a copy. As part of our patient safety program to minimize surgical site infections, we ask you to do the following:    · Please notify your surgeon if you develop any illness between         now and the  day of your surgery. · This includes a cough, cold, fever, sore throat, nausea,         or vomiting, and diarrhea, etc.  ·  Please notify your surgeon if you experience dizziness, shortness         of breath or blurred vision between now and the time of your surgery. Do not shave your operative site 96 hours prior to surgery. For face and neck surgery, men may use an electric razor 48 hours   prior to surgery. You may shower the night before surgery or the morning of   your surgery with an antibacterial soap. You will need to bring a photo ID and insurance card    [facility] has an onsite pharmacy, would you like to utilize our pharmacy     If you will be staying overnight and use a C-pap machine, please bring   your C-pap to hospital     Our goal is to provide you with excellent care, therefore, visitors will be limited to two(2) in the room at a time so that we may focus on providing this care for you. Please contact pre-admission testing if you have any further questions.                  [facility] phone number:  [phone]    Please note these are generalized instructions for all surgical cases, you may be provided with more specific instructions according to your surgery.

## 2020-10-26 ENCOUNTER — ANESTHESIA EVENT (OUTPATIENT)
Dept: SURGERY | Age: 70
End: 2020-10-26
Payer: MEDICARE

## 2020-10-26 RX ORDER — CYCLOPENTOLATE HYDROCHLORIDE 10 MG/ML
1 SOLUTION/ DROPS OPHTHALMIC SEE ADMIN INSTRUCTIONS
Status: CANCELLED | OUTPATIENT
Start: 2020-10-26

## 2020-10-26 RX ORDER — CIPROFLOXACIN HYDROCHLORIDE 3.5 MG/ML
1 SOLUTION/ DROPS TOPICAL SEE ADMIN INSTRUCTIONS
Status: CANCELLED | OUTPATIENT
Start: 2020-10-26

## 2020-10-26 RX ORDER — PHENYLEPHRINE HYDROCHLORIDE 25 MG/ML
1 SOLUTION/ DROPS OPHTHALMIC SEE ADMIN INSTRUCTIONS
Status: CANCELLED | OUTPATIENT
Start: 2020-10-26

## 2020-10-26 RX ORDER — SODIUM CHLORIDE 0.9 % (FLUSH) 0.9 %
10 SYRINGE (ML) INJECTION EVERY 12 HOURS SCHEDULED
Status: CANCELLED | OUTPATIENT
Start: 2020-10-26

## 2020-10-26 RX ORDER — TETRACAINE HYDROCHLORIDE 5 MG/ML
1 SOLUTION OPHTHALMIC SEE ADMIN INSTRUCTIONS
Status: CANCELLED | OUTPATIENT
Start: 2020-10-26

## 2020-10-26 RX ORDER — SODIUM CHLORIDE 0.9 % (FLUSH) 0.9 %
10 SYRINGE (ML) INJECTION PRN
Status: CANCELLED | OUTPATIENT
Start: 2020-10-26

## 2020-10-26 NOTE — PROGRESS NOTES
[address]        Pre-Op Phone Call:     Patient Name: Rosi Teran     Telephone Information:   Mobile [phone]     Home phone:  [phone]    Surgery Time:   12:50 PM     Arrival Time:  1120     Left extended Message:  NA     Message left with:     Recent change in health status:  No     Advised of transportation/ policy:  Yes     NPO policy reviewed:  Yes clear liquids until 0800     Advised to take morning heart/blood pressure medications with sips of water morning of surgery? Yes     Instructed to bring eye drops, photo identification, and insurance card day of surgery? Yes     Advised to wear short sleeved button down shirt (no T-shirt underneath):  Yes     Advised not to wear jewelry, hairpins, or pantyhose day of surgery? Yes     Advised not to wear make-up and to wash face day of surgery?   Yes    Remarks:        Electronically signed by:  Albertina Raymundo RN at 10/26/2020 2:24 PM

## 2020-10-27 ENCOUNTER — ANESTHESIA (OUTPATIENT)
Dept: SURGERY | Age: 70
End: 2020-10-27
Payer: MEDICARE

## 2020-10-27 ENCOUNTER — HOSPITAL ENCOUNTER (OUTPATIENT)
Age: 70
Setting detail: OUTPATIENT SURGERY
Discharge: HOME | End: 2020-10-27
Attending: OPHTHALMOLOGY | Admitting: OPHTHALMOLOGY
Payer: MEDICARE

## 2020-10-27 VITALS — OXYGEN SATURATION: 100 % | DIASTOLIC BLOOD PRESSURE: 70 MMHG | SYSTOLIC BLOOD PRESSURE: 151 MMHG

## 2020-10-27 VITALS
DIASTOLIC BLOOD PRESSURE: 71 MMHG | WEIGHT: 180 LBS | TEMPERATURE: 97.7 F | HEART RATE: 73 BPM | BODY MASS INDEX: 33.99 KG/M2 | RESPIRATION RATE: 16 BRPM | SYSTOLIC BLOOD PRESSURE: 153 MMHG | HEIGHT: 61 IN | OXYGEN SATURATION: 100 %

## 2020-10-27 PROCEDURE — 6370000000 HC RX 637 (ALT 250 FOR IP): Performed by: OPHTHALMOLOGY

## 2020-10-27 PROCEDURE — 3700000001 HC ADD 15 MINUTES (ANESTHESIA): Performed by: OPHTHALMOLOGY

## 2020-10-27 PROCEDURE — 3600000014 HC SURGERY LEVEL 4 ADDTL 15MIN: Performed by: OPHTHALMOLOGY

## 2020-10-27 PROCEDURE — 2709999900 HC NON-CHARGEABLE SUPPLY: Performed by: OPHTHALMOLOGY

## 2020-10-27 PROCEDURE — 3700000000 HC ANESTHESIA ATTENDED CARE: Performed by: OPHTHALMOLOGY

## 2020-10-27 PROCEDURE — 7100000010 HC PHASE II RECOVERY - FIRST 15 MIN: Performed by: OPHTHALMOLOGY

## 2020-10-27 PROCEDURE — 6360000002 HC RX W HCPCS: Performed by: NURSE ANESTHETIST, CERTIFIED REGISTERED

## 2020-10-27 PROCEDURE — 2720000010 HC SURG SUPPLY STERILE: Performed by: OPHTHALMOLOGY

## 2020-10-27 PROCEDURE — 3600000004 HC SURGERY LEVEL 4 BASE: Performed by: OPHTHALMOLOGY

## 2020-10-27 PROCEDURE — 2580000003 HC RX 258: Performed by: ANESTHESIOLOGY

## 2020-10-27 PROCEDURE — 2500000003 HC RX 250 WO HCPCS: Performed by: OPHTHALMOLOGY

## 2020-10-27 PROCEDURE — 6360000002 HC RX W HCPCS: Performed by: OPHTHALMOLOGY

## 2020-10-27 PROCEDURE — V2632 POST CHMBR INTRAOCULAR LENS: HCPCS | Performed by: OPHTHALMOLOGY

## 2020-10-27 DEVICE — IMPLANTABLE DEVICE: Type: IMPLANTABLE DEVICE | Site: EYE | Status: FUNCTIONAL

## 2020-10-27 RX ORDER — SODIUM CHLORIDE 0.9 % (FLUSH) 0.9 %
10 SYRINGE (ML) INJECTION PRN
Status: DISCONTINUED | OUTPATIENT
Start: 2020-10-27 | End: 2020-10-27 | Stop reason: SDUPTHER

## 2020-10-27 RX ORDER — TETRACAINE HYDROCHLORIDE 5 MG/ML
SOLUTION OPHTHALMIC
Status: COMPLETED | OUTPATIENT
Start: 2020-10-27 | End: 2020-10-27

## 2020-10-27 RX ORDER — CYCLOPENTOLATE HYDROCHLORIDE 10 MG/ML
1 SOLUTION/ DROPS OPHTHALMIC SEE ADMIN INSTRUCTIONS
Status: DISCONTINUED | OUTPATIENT
Start: 2020-10-27 | End: 2020-10-27 | Stop reason: HOSPADM

## 2020-10-27 RX ORDER — SODIUM CHLORIDE 0.9 % (FLUSH) 0.9 %
10 SYRINGE (ML) INJECTION EVERY 12 HOURS SCHEDULED
Status: DISCONTINUED | OUTPATIENT
Start: 2020-10-27 | End: 2020-10-27 | Stop reason: HOSPADM

## 2020-10-27 RX ORDER — ONDANSETRON 2 MG/ML
4 INJECTION INTRAMUSCULAR; INTRAVENOUS
Status: DISCONTINUED | OUTPATIENT
Start: 2020-10-27 | End: 2020-10-27 | Stop reason: HOSPADM

## 2020-10-27 RX ORDER — MIDAZOLAM HYDROCHLORIDE 1 MG/ML
INJECTION INTRAMUSCULAR; INTRAVENOUS PRN
Status: DISCONTINUED | OUTPATIENT
Start: 2020-10-27 | End: 2020-10-27 | Stop reason: SDUPTHER

## 2020-10-27 RX ORDER — BRIMONIDINE TARTRATE 2 MG/ML
SOLUTION/ DROPS OPHTHALMIC
Status: COMPLETED | OUTPATIENT
Start: 2020-10-27 | End: 2020-10-27

## 2020-10-27 RX ORDER — SODIUM CHLORIDE 0.9 % (FLUSH) 0.9 %
10 SYRINGE (ML) INJECTION EVERY 12 HOURS SCHEDULED
Status: DISCONTINUED | OUTPATIENT
Start: 2020-10-27 | End: 2020-10-27 | Stop reason: SDUPTHER

## 2020-10-27 RX ORDER — SODIUM CHLORIDE 9 MG/ML
INJECTION, SOLUTION INTRAVENOUS CONTINUOUS
Status: DISCONTINUED | OUTPATIENT
Start: 2020-10-27 | End: 2020-10-27 | Stop reason: HOSPADM

## 2020-10-27 RX ORDER — OFLOXACIN 3 MG/ML
SOLUTION/ DROPS OPHTHALMIC
Status: COMPLETED | OUTPATIENT
Start: 2020-10-27 | End: 2020-10-27

## 2020-10-27 RX ORDER — TETRACAINE HYDROCHLORIDE 5 MG/ML
1 SOLUTION OPHTHALMIC SEE ADMIN INSTRUCTIONS
Status: DISCONTINUED | OUTPATIENT
Start: 2020-10-27 | End: 2020-10-27 | Stop reason: HOSPADM

## 2020-10-27 RX ORDER — PHENYLEPHRINE HCL 2.5 %
1 DROPS OPHTHALMIC (EYE) SEE ADMIN INSTRUCTIONS
Status: DISCONTINUED | OUTPATIENT
Start: 2020-10-27 | End: 2020-10-27 | Stop reason: HOSPADM

## 2020-10-27 RX ORDER — CIPROFLOXACIN HYDROCHLORIDE 3.5 MG/ML
1 SOLUTION/ DROPS TOPICAL
Status: COMPLETED | OUTPATIENT
Start: 2020-10-27 | End: 2020-10-27

## 2020-10-27 RX ORDER — SODIUM CHLORIDE 0.9 % (FLUSH) 0.9 %
10 SYRINGE (ML) INJECTION PRN
Status: DISCONTINUED | OUTPATIENT
Start: 2020-10-27 | End: 2020-10-27 | Stop reason: HOSPADM

## 2020-10-27 RX ADMIN — CYCLOPENTOLATE HYDROCHLORIDE 1 DROP: 10 SOLUTION/ DROPS OPHTHALMIC at 11:45

## 2020-10-27 RX ADMIN — SODIUM CHLORIDE: 9 INJECTION, SOLUTION INTRAVENOUS at 11:42

## 2020-10-27 RX ADMIN — TETRACAINE HYDROCHLORIDE 1 DROP: 5 SOLUTION OPHTHALMIC at 11:45

## 2020-10-27 RX ADMIN — PHENYLEPHRINE HYDROCHLORIDE 1 DROP: 25 SOLUTION/ DROPS OPHTHALMIC at 11:45

## 2020-10-27 RX ADMIN — CIPROFLOXACIN 1 DROP: 3 SOLUTION OPHTHALMIC at 11:45

## 2020-10-27 RX ADMIN — MIDAZOLAM 2 MG: 1 INJECTION INTRAMUSCULAR; INTRAVENOUS at 13:17

## 2020-10-27 RX ADMIN — CYCLOPENTOLATE HYDROCHLORIDE 1 DROP: 10 SOLUTION/ DROPS OPHTHALMIC at 11:50

## 2020-10-27 RX ADMIN — PHENYLEPHRINE HYDROCHLORIDE 1 DROP: 25 SOLUTION/ DROPS OPHTHALMIC at 11:50

## 2020-10-27 RX ADMIN — SODIUM CHLORIDE: 9 INJECTION, SOLUTION INTRAVENOUS at 11:51

## 2020-10-27 RX ADMIN — CIPROFLOXACIN 1 DROP: 3 SOLUTION OPHTHALMIC at 11:50

## 2020-10-27 ASSESSMENT — PAIN SCALES - GENERAL
PAINLEVEL_OUTOF10: 0
PAINLEVEL_OUTOF10: 0

## 2020-10-27 ASSESSMENT — PAIN - FUNCTIONAL ASSESSMENT: PAIN_FUNCTIONAL_ASSESSMENT: 0-10

## 2020-10-27 NOTE — ANESTHESIA PRE PROCEDURE
[facility] Department of Anesthesiology  Pre-Anesthesia Evaluation/Consultation       Name:  Rosi Teran  : 1950  Age:  65 y.o. MRN:  [id number]  Date: 10/27/2020           Surgeon: Surgeon(s):  Christi Sosa MD    Procedure: Procedure(s):  RIGHT EYE Phacoemulsification with intraocular lens implant     Allergies   Allergen Reactions    Celebrex [Celecoxib] Rash     Head to toe rash     Patient Active Problem List   Diagnosis    Basal cell carcinoma of right alar groove    Visit for wound check    Visit for suture removal     Past Medical History:   Diagnosis Date    Basal cell carcinoma of right alar groove 2015    History of melanoma     History of poliomyelitis     Hyperlipidemia     Hypertension      Past Surgical History:   Procedure Laterality Date    CARDIAC SURGERY      as child     CATARACT REMOVAL WITH IMPLANT Left 2020    CHOLECYSTECTOMY      CORONARY ANGIOPLASTY WITH STENT PLACEMENT      INTRACAPSULAR CATARACT EXTRACTION Left 2020    LEFT EYE Phacoemulsification with intraocular lens implant performed by Christi Sosa MD at [place] [address]       Social History     Tobacco Use    Smoking status: Never Smoker    Smokeless tobacco: Never Used   Substance Use Topics    Alcohol use:  Yes    Drug use: No     Medications  Current Facility-Administered Medications on File Prior to Visit   Medication Dose Route Frequency Provider Last Rate Last Dose    0.9 % sodium chloride infusion   Intravenous Continuous Rik Kruse MD        sodium chloride flush 0.9 % injection 10 mL  10 mL Intravenous 2 times per day Rik Kruse MD        sodium chloride flush 0.9 % injection 10 mL  10 mL Intravenous PRN Rik Kruse MD        ciprofloxacin (CILOXAN) 0.3 % ophthalmic solution 1 drop  1 drop Right Eye Q5 Shruthi Garza MD        cyclopentolate (CYCLOGYL) 1 % ophthalmic solution 1 drop  1 drop Right Eye See Admin Instructions Mirta Henderson MD        phenylephrine (MYDFRIN) 2.5 % ophthalmic solution 1 drop  1 drop Right Eye See Admin Instructions Mirta Henderson MD        tetracaine (TETRAVISC) 0.5 % ophthalmic solution 1 drop  1 drop Right Eye See Admin Instructions Mirta Henderson MD         Current Outpatient Medications on File Prior to Visit   Medication Sig Dispense Refill    hydrocortisone 2.5 % cream Apply topically 2 times daily to the FH prn flares 30 g 1    atorvastatin (LIPITOR) 40 MG tablet Take 40 mg by mouth      oxybutynin (DITROPAN) 5 MG tablet TAKE 1 TABLET BY MOUTH EVERY DAY  2    coenzyme Q10 100 MG CAPS capsule Take 300 mg by mouth daily      aspirin 81 MG tablet Take 81 mg by mouth daily      losartan (COZAAR) 25 MG tablet Indications: takes at night        No current facility-administered medications for this visit. No current outpatient medications on file. Facility-Administered Medications Ordered in Other Visits   Medication Dose Route Frequency Provider Last Rate Last Dose    0.9 % sodium chloride infusion   Intravenous Continuous Nuha Robles MD        sodium chloride flush 0.9 % injection 10 mL  10 mL Intravenous 2 times per day Nuha Robles MD        sodium chloride flush 0.9 % injection 10 mL  10 mL Intravenous PRN Nuha Robles MD        ciprofloxacin (CILOXAN) 0.3 % ophthalmic solution 1 drop  1 drop Right Eye Q5 Min Mirta Henderson MD        cyclopentolate (CYCLOGYL) 1 % ophthalmic solution 1 drop  1 drop Right Eye See Admin Instructions Mirta Henderson MD        phenylephrine (MYDFRIN) 2.5 % ophthalmic solution 1 drop  1 drop Right Eye See Admin Instructions Mirta Henderson MD        tetracaine (TETRAVISC) 0.5 % ophthalmic solution 1 drop  1 drop Right Eye See Admin Instructions Mirta Henderson MD         Vital Signs (Current)   There were no vitals filed for this visit.   Vital Signs Statistics (for past 48 hrs)     No data recorded    BP Readings from Last 3 Encounters:   09/22/20 (!) 184/79   09/22/20 (!) 155/66   06/30/15 161/79     BMI  There is no height or weight on file to calculate BMI. Estimated body mass index is 34.01 kg/m² as calculated from the following:    Height as of 10/20/20: 5' 1\" (1.549 m). Weight as of 10/20/20: 180 lb (81.6 kg). CBC No results found for: WBC, RBC, HGB, HCT, MCV, RDW, PLT  CMP  No results found for: NA, K, CL, CO2, BUN, CREATININE, GFRAA, AGRATIO, LABGLOM, GLUCOSE, PROT, CALCIUM, BILITOT, ALKPHOS, AST, ALT  BMP  No results found for: NA, K, CL, CO2, BUN, CREATININE, CALCIUM, GFRAA, LABGLOM, GLUCOSE  POCGlucose  No results for input(s): GLUCOSE in the last 72 hours. Coags  No results found for: PROTIME, INR, APTT  HCG (If Applicable) No results found for: PREGTESTUR, PREGSERUM, HCG, HCGQUANT   ABGs No results found for: PHART, PO2ART, XBL0WKG, FNF4VMI, BEART, L1HGKVIZ   Type & Screen (If Applicable)  No results found for: LABABO, LABRH                         BMI: Wt Readings from Last 3 Encounters:       NPO Status:                          Anesthesia Evaluation  Patient summary reviewed no history of anesthetic complications:   Airway: Mallampati: III  TM distance: >3 FB   Neck ROM: full   Dental:          Pulmonary:Negative Pulmonary ROS and normal exam                               Cardiovascular:  Exercise tolerance: good (>4 METS),   (+) hypertension:, CAD:, CABG/stent (Stent 2018):, hyperlipidemia        Rhythm: regular  Rate: normal           Beta Blocker:  Not on Beta Blocker         Neuro/Psych:   Negative Neuro/Psych ROS              GI/Hepatic/Renal: Neg GI/Hepatic/Renal ROS            Endo/Other: Negative Endo/Other ROS   (+) malignancy/cancer. Abdominal:   (+) obese,         Vascular: negative vascular ROS. Anesthesia Plan      MAC     ASA 3       Induction: intravenous.     MIPS: Prophylactic antiemetics administered. Anesthetic plan and risks discussed with patient. Plan discussed with CRNA. Attending anesthesiologist reviewed and agrees with Pre Eval content          This pre-anesthesia assessment may be used as a history and physical.    DOS STAFF ADDENDUM:    Pt seen and examined, chart reviewed (including anesthesia, drug and allergy history). No interval changes to history and physical examination. Anesthetic plan, risks, benefits, alternatives, and personnel involved discussed with patient. Questions and concerns addressed. Patient(family) verbalized an understanding and agrees to proceed.       Sanjiv Sorensen MD  October 27, 2020  11:43 AM

## 2020-10-27 NOTE — OP NOTE
[facility]  [address]  [phone]      10/27/2020    Patient name: Amy Farnsworth  YOB: 1950  MRN: [id number]         OPERATIVE REPORT      DATE OF OPERATION: 10/27/2020     SURGEON: Brenden Jim  ASSISTANT(S): None            PREOPERATIVE DIAGNOSIS:  Age-related Nuclear Sclerotic Cataract -right eye  POSTOPERATIVE DIAGNOSIS:  same    OPERATION PERFORMED: Procedure(s):  RIGHT EYE Phacoemulsification with intraocular lens implant -right eye  ANESTHESIA:   Monitor Anesthesia Care  ESTIMATED BLOOD LOSS:  None  COMPLICATIONS:  None  IOL USED:  [employer]ch [employer] Lomb MX60E 23.5    INDICATIONS FOR PROCEDURE:    Best corrected visual acuity of slit lamp confirmation cataract:  20/50 glare  Patient's evaluation of visual status of operative eye:  Decreased vision with reading and night vision times months. DESCRIPTION OF PROCEDURE: Amy Farnsworth, is a 65 y.o. female. After topical  anesthesia and pupillary dilation were obtained, the patient was prepped and draped in the usual sterile fashion for cataract implant surgery. A wire lid speculum was placed and the operating microscope was moved into position over the eye. A paracentesis clear corneal incision was made with a super blade. Approximately 0.5 ml Epi-Shugarcaine was injected into the anterior chamber. This was followed by Viscoat to fill the anterior chamber. A temporal clear corneal incision was made with a 2.75 mm keratome and a bent needle and Utrata forceps were used to perform an anterior capsulorhexis. Hydrodissection was performed on the cataract. Phacoemulsification of the nucleus was performed. Cortex was removed using an automated irrigation/aspiration hand piece and vacuuming of the posterior capsule was also carried out with the same hand piece on minimal settings.  Provisc was used to re-inflate the capsular bag and a foldable intraocular lens was placed into the capsular bag. Provisc was removed from posterior to the implant and anterior to the implant by using a Stefan irrigation/aspiration hand piece. 0.2 ml Cefuroxime was placed in the anterior chamber. The corneal incision was checked and the incision was watertight without suture. The wire lid speculum was removed and the patient received topical Ciprofloxacin, and Alphagan P drops. At the conclusion of the procedure, the cornea was clear and the anterior chamber was deep, the pupil was round, and the implant was in good position. The patient tolerated the procedure well and was returned to the recovery room in good condition to be seen for follow-up the next day. ATTENDING ATTESTATION: I was present and scrubbed for the entire procedure.       ELECTRONICALLY SIGNED BY: Vladislav Grayson, 10/27/2020 1:43 PM

## 2020-10-27 NOTE — H&P
Date of Surgery Update:  Анна Reyes was seen, history and physical examination reviewed, and patient examined by me today. There have been no significant clinical changes since the completion of the previous history and physical.    The risk, benefits, and alternatives of the proposed procedure have been explained to the patient (or appropriate guardian) and understanding verbalized. All questions answered. Patient wishes to proceed.     Electronically signed by: Zaira Fletcher MD,10/27/2020,12:26 PM

## 2021-08-30 ENCOUNTER — TELEPHONE (OUTPATIENT)
Dept: DERMATOLOGY | Age: 71
End: 2021-08-30

## 2021-08-30 NOTE — TELEPHONE ENCOUNTER
Patient is requesting a return call to schedule an appt for both herself and her  Venita Krishnamurthy in the first part of October. Patient is going to [facility] middle of October. Please advise. Thank you!

## 2021-10-11 ENCOUNTER — OFFICE VISIT (OUTPATIENT)
Dept: DERMATOLOGY | Age: 71
End: 2021-10-11
Payer: MEDICARE

## 2021-10-11 VITALS — TEMPERATURE: 97 F

## 2021-10-11 DIAGNOSIS — D22.9 MULTIPLE NEVI: ICD-10-CM

## 2021-10-11 DIAGNOSIS — Z85.828 HISTORY OF NONMELANOMA SKIN CANCER: ICD-10-CM

## 2021-10-11 DIAGNOSIS — L82.1 SEBORRHEIC KERATOSIS: ICD-10-CM

## 2021-10-11 DIAGNOSIS — L21.9 SEBORRHEIC DERMATITIS: ICD-10-CM

## 2021-10-11 DIAGNOSIS — L57.0 AK (ACTINIC KERATOSIS): ICD-10-CM

## 2021-10-11 DIAGNOSIS — Z80.8 FAMILY HISTORY OF MALIGNANT MELANOMA: ICD-10-CM

## 2021-10-11 DIAGNOSIS — Z85.820 HISTORY OF MELANOMA: Primary | ICD-10-CM

## 2021-10-11 PROCEDURE — 1123F ACP DISCUSS/DSCN MKR DOCD: CPT | Performed by: DERMATOLOGY

## 2021-10-11 PROCEDURE — G8421 BMI NOT CALCULATED: HCPCS | Performed by: DERMATOLOGY

## 2021-10-11 PROCEDURE — G8427 DOCREV CUR MEDS BY ELIG CLIN: HCPCS | Performed by: DERMATOLOGY

## 2021-10-11 PROCEDURE — 1090F PRES/ABSN URINE INCON ASSESS: CPT | Performed by: DERMATOLOGY

## 2021-10-11 PROCEDURE — 17004 DESTROY PREMAL LESIONS 15/>: CPT | Performed by: DERMATOLOGY

## 2021-10-11 PROCEDURE — 99213 OFFICE O/P EST LOW 20 MIN: CPT | Performed by: DERMATOLOGY

## 2021-10-11 PROCEDURE — G8400 PT W/DXA NO RESULTS DOC: HCPCS | Performed by: DERMATOLOGY

## 2021-10-11 PROCEDURE — 4040F PNEUMOC VAC/ADMIN/RCVD: CPT | Performed by: DERMATOLOGY

## 2021-10-11 PROCEDURE — 3017F COLORECTAL CA SCREEN DOC REV: CPT | Performed by: DERMATOLOGY

## 2021-10-11 PROCEDURE — G8484 FLU IMMUNIZE NO ADMIN: HCPCS | Performed by: DERMATOLOGY

## 2021-10-11 PROCEDURE — 1036F TOBACCO NON-USER: CPT | Performed by: DERMATOLOGY

## 2021-10-11 NOTE — PROGRESS NOTES
[facility]  Cecily Reddy MD  [phone]      Baudilio Bonilla  1950    66 y.o. female     Date of Visit: 10/11/2021    Chief Complaint: f/u melanoma, lesions  Chief Complaint   Patient presents with    Skin Exam     few areas on face      Last seen: 9-2020/    *heart stent 3-2019 and on plavix    History of Present Illness:  *living in [facility]. Andrew Ortega full time now; going back to [facility] Oct 9; sees a derm named Dr. Norma West in [facility]    1. hx of MM on the posterior neck (see below) - here for full skin check; no new lesions or concerns with scar/site; no LAD, no HA, no F/C/NS. Followed with Dr. Morelia Benz as well but he released her after her 6-2016 visit - had imaging done - PET and MRI negative for mets 6-2016.  + family hx of melanoma (brother and sister)    2. Mult nevi - no other new concerns or changing lesions   3. Hx of NMSC - no concerns with previous sites. Most recently - L upper back - site A - nod BCC- excision     4. She has scattered rough lesions on the face. Hx of AK's.    5. F/u seb derm - dry itchy skin on the FH and near the brows. Was given HC 2.5 in the past but hasn't used anything recently. Most of her pruritic areas today are AK's. Status post shave removal of pigmented SK on the right forehead at 2020 visit. Dermatology History   hx MM, posterior neck. 4.5 mm at least, level IV, ulceration identified. Deep margin involved, mitotic index 10/mm2. No clinical evidence of regional or distant metastatic disease. Nodes negative, stage IIb. Excised 2-2012 by Dr. Morelia Benz  2-1606 imaging negative, PET and MRI neg 5-2014    edc of pig BCC on L lower back - 2012  S/p 3 weeks of efudex for HAK with focal SCC in situ on the L cheek   BCC - R ala - Mohs 6-2015  BCC - central back, L of midline- excision 9-2019    Review of Systems: see other ROS above  Gen: Feels well, good sense of health. Had lost 30 lbs intentionally (has gained most back).   Skin: No changing moles or lesions   Neuro: no HA  Heme/Lymph: no LAD    Past Medical History, Family History, Surgical History, Medications and Allergies reviewed. Past Medical History:   Diagnosis Date    Basal cell carcinoma of right alar groove 6/9/2015    History of melanoma     History of poliomyelitis     Hyperlipidemia     Hypertension        Past Surgical History:   Procedure Laterality Date    CARDIAC SURGERY      as child     CATARACT REMOVAL WITH IMPLANT Left 09/22/2020    CHOLECYSTECTOMY      CORONARY ANGIOPLASTY WITH STENT PLACEMENT      INTRACAPSULAR CATARACT EXTRACTION Left 9/22/2020    LEFT EYE Phacoemulsification with intraocular lens implant performed by Derrick Hartman MD at [facility] Right 10/27/2020    RIGHT EYE Phacoemulsification with intraocular lens implant performed by Derrick Hartman MD at [facility]         Outpatient Medications Marked as Taking for the 10/11/21 encounter (Office Visit) with Carolyn Maurer MD   Medication Sig Dispense Refill    hydrocortisone 2.5 % cream Apply topically 2 times daily to the FH prn flares 30 g 1    atorvastatin (LIPITOR) 40 MG tablet Take 40 mg by mouth      oxybutynin (DITROPAN) 5 MG tablet TAKE 1 TABLET BY MOUTH EVERY DAY  2    aspirin 81 MG tablet Take 81 mg by mouth daily      losartan (COZAAR) 25 MG tablet Indications: takes at night          Allergies   Allergen Reactions    Celebrex [Celecoxib] Rash     Head to toe rash         Physical Examination     Gen, NAD  FSE today     1. Scar clear; no palpable LAD; she is dark tan   2. trunk and extremities with scattered brown macules and papules   3. Scars clear  4. Forehead, cheeks and temples with roughly scaled erythematous macules  5. Lower FH/glabella with ill-defined mildly flaky skin    Assessment and Plan     1.  Hx of melanoma, no signs recurrence and family hx of melanoma, no signs of recurrence  - educ re ABCD's of MM   educ and encouraged better sun protection SPF 30+  encouraged skin check in 6 mos (sooner if indicated), self checks monthly    2. Benign-appearing nevi  - ed as above    3. Hx of NMSC, no signs recurrence  - ed as above    4. AK's - face -multiple new  - 15 lesion(s) on the face treated with liquid nitrogen x 2 cycles. Patient educated on risk of blister, hypopigmentation/scar and wound care. -Continue sun protection  -discussed need for biopsy if persistent for several lesions on the forehead and cheeks    5. Mild seb derm - glabella/lower FH -stable  - HC 2.5 bid prn flares; ed se/misuse    F/u 6 mos with [place] derm and in 1 year here.

## 2022-09-06 ENCOUNTER — OFFICE VISIT (OUTPATIENT)
Dept: DERMATOLOGY | Age: 72
End: 2022-09-06
Payer: MEDICARE

## 2022-09-06 DIAGNOSIS — L21.9 SEBORRHEIC DERMATITIS: ICD-10-CM

## 2022-09-06 DIAGNOSIS — D48.5 NEOPLASM OF UNCERTAIN BEHAVIOR OF SKIN: ICD-10-CM

## 2022-09-06 DIAGNOSIS — Z85.820 HISTORY OF MELANOMA: Primary | ICD-10-CM

## 2022-09-06 DIAGNOSIS — D22.9 MULTIPLE NEVI: ICD-10-CM

## 2022-09-06 DIAGNOSIS — Z85.828 HISTORY OF NONMELANOMA SKIN CANCER: ICD-10-CM

## 2022-09-06 DIAGNOSIS — Z80.8 FAMILY HISTORY OF MALIGNANT MELANOMA: ICD-10-CM

## 2022-09-06 DIAGNOSIS — L57.0 AK (ACTINIC KERATOSIS): ICD-10-CM

## 2022-09-06 DIAGNOSIS — L81.4 LENTIGINES: ICD-10-CM

## 2022-09-06 PROCEDURE — 1123F ACP DISCUSS/DSCN MKR DOCD: CPT | Performed by: DERMATOLOGY

## 2022-09-06 PROCEDURE — 17004 DESTROY PREMAL LESIONS 15/>: CPT | Performed by: DERMATOLOGY

## 2022-09-06 PROCEDURE — 11102 TANGNTL BX SKIN SINGLE LES: CPT | Performed by: DERMATOLOGY

## 2022-09-06 PROCEDURE — 11103 TANGNTL BX SKIN EA SEP/ADDL: CPT | Performed by: DERMATOLOGY

## 2022-09-06 PROCEDURE — 99213 OFFICE O/P EST LOW 20 MIN: CPT | Performed by: DERMATOLOGY

## 2022-09-06 NOTE — PROGRESS NOTES
[facility]  Flavia Ledesma MD  [phone]      Félix Partida  1950    74 y.o. female     Date of Visit: 9/6/2022    Chief Complaint: f/u melanoma, lesions  Chief Complaint   Patient presents with    Skin Exam     Last seen:     *heart stent 3-2019 and on plavix    History of Present Illness:  *living in [facility] [place]. Jorgito Mehta full time now; going back to [facility] Oct 9; sees a derm -  Dr. Heather Dumont - in [facility]    1. hx of MM on the posterior neck (see below) - here for full skin check; no new lesions or concerns with scar/site; no LAD, no HA, no F/C/NS. Followed with Dr. Edwin Melendez as well but he released her after her 6-2016 visit - had imaging done - PET and MRI negative for mets 6-2016.  + family hx of melanoma (brother and sister)    2. Mult nevi - no other new concerns or changing lesions   3. Hx of NMSC - no concerns with previous sites. Most recently - L upper back - site A - nod BCC- excision     4. She has scattered rough lesions on the face and R hand. Hx of AK's. 5. She has a few subtle concerning areas on the nose - one area with pinpoint bleeding intermittently on the L dorsum. 6. F/u seb derm - dry itchy skin on the FH and near the brows. Was given HC 2.5 in the past but hasn't used anything recently. Most of her pruritic areas today are AK's. Status post shave removal of pigmented SK on the right forehead at 2020 visit. Dermatology History   hx MM, posterior neck. 4.5 mm at least, level IV, ulceration identified. Deep margin involved, mitotic index 10/mm2. No clinical evidence of regional or distant metastatic disease. Nodes negative, stage IIb.   Excised 2-2012 by Dr. Edwin Melendez  5-5025 imaging negative, PET and MRI neg 5-2014    edc of pig BCC on L lower back - 2012  S/p 3 weeks of efudex for HAK with focal SCC in situ on the L cheek   BCC - R ala - Mohs 6-2015  BCC - central back, L of midline- excision 9-2019    Review of Systems: see other ROS above  Gen: [facility] Feels well, good sense of health. Skin: No changing moles or lesions   Neuro: no HA  Heme/Lymph: no LAD    Past Medical History, Family History, Surgical History, Medications and Allergies reviewed. Past Medical History:   Diagnosis Date    Basal cell carcinoma of right alar groove 6/9/2015    History of melanoma     History of poliomyelitis     Hyperlipidemia     Hypertension        Past Surgical History:   Procedure Laterality Date    CARDIAC SURGERY      as child     CATARACT REMOVAL WITH IMPLANT Left 09/22/2020    CHOLECYSTECTOMY      CORONARY ANGIOPLASTY WITH STENT PLACEMENT      INTRACAPSULAR CATARACT EXTRACTION Left 9/22/2020    LEFT EYE Phacoemulsification with intraocular lens implant performed by Ulices Lawler MD at [address] Right 10/27/2020    RIGHT EYE Phacoemulsification with intraocular lens implant performed by Ulices Lawler MD at [address] [facility]         Outpatient Medications Marked as Taking for the 9/6/22 encounter (Office Visit) with Roxi Porter MD   Medication Sig Dispense Refill    hydrocortisone 2.5 % cream Apply topically 2 times daily to the FH prn flares 30 g 1    atorvastatin (LIPITOR) 40 MG tablet Take 40 mg by mouth      oxybutynin (DITROPAN) 5 MG tablet TAKE 1 TABLET BY MOUTH EVERY DAY  2    coenzyme Q10 100 MG CAPS capsule Take 300 mg by mouth daily      aspirin 81 MG tablet Take 81 mg by mouth daily      losartan (COZAAR) 25 MG tablet Indications: takes at night          Allergies   Allergen Reactions    Celebrex [Celecoxib] Rash     Head to toe rash         Physical Examination     Gen, NAD  FSE today     1. Scar clear; no palpable LAD; she is tan   2. trunk and extremities with scattered brown macules and papules   3. Scars clear  4. Forehead, cheeks and temples with roughly scaled erythematous macules  5. Dorsum of the nose with 2 pink scaled macules  6.  Lower FH/glabella with ill-defined mildly flaky skin    Assessment and Plan     1. Hx of melanoma, no signs recurrence and family hx of melanoma, no signs of recurrence  - monitor for ABCD's of MM   educ and encouraged better sun protection SPF 30+  encouraged skin check in 6 mos (sooner if indicated), self checks monthly    2. Benign-appearing nevi and lentigines  - ed as above    3. Hx of NMSC, no signs recurrence  - ed as above    4. AK's - R hand and face -multiple new/persistent  - 15 -  lesion(s) treated with liquid nitrogen with cryac or swab. Treated with 2 cycles for 1-5 seconds each after consent from patient. Patient educated on risk of blister, hypopigmentation/scar and wound care. Tolerated well.   -Continue sun protection  -discussed need for biopsy if persistent for several lesions on the forehead and cheeks    5. Dorsum of the nose (R) - r/o AK vs BCC  Dorsum of the nose (L) - r/o AK vs BCC  - 2 Shave biopsy performed after verbal consent obtained. Patient educated regarding risk of bleeding, infection, scar and educated on wound care. Skin cleansed with alcohol pad and site anesthetized with lido + epi. Aluminum chloride applied to site for hemostasis. Petrolatum ointment and bandage applied. Specimen bottle labeled with patient information and site and specimen sent to dermpath. 6. Mild seb derm - glabella/lower FH -stable  - HC 2.5 bid prn flares; ed se/misuse    [place] with [place] derm and in 1 year here.

## 2022-09-06 NOTE — PATIENT INSTRUCTIONS

## 2022-09-12 LAB — DERMATOLOGY PATHOLOGY REPORT: NORMAL

## 2022-09-15 RX ORDER — FLUOROURACIL 50 MG/G
CREAM TOPICAL
Qty: 40 G | Refills: 0 | Status: SHIPPED | OUTPATIENT
Start: 2022-09-15

## 2023-11-15 RX ORDER — FLUOROURACIL 50 MG/G
CREAM TOPICAL
Qty: 40 G | Refills: 0 | OUTPATIENT
Start: 2023-11-15

## 2024-10-08 ENCOUNTER — OFFICE VISIT (OUTPATIENT)
Dept: DERMATOLOGY | Age: 74
End: 2024-10-08
Payer: MEDICARE

## 2024-10-08 DIAGNOSIS — Z85.820 HISTORY OF MELANOMA: Primary | ICD-10-CM

## 2024-10-08 DIAGNOSIS — D48.5 NEOPLASM OF UNCERTAIN BEHAVIOR OF SKIN: ICD-10-CM

## 2024-10-08 DIAGNOSIS — L21.9 SEBORRHEIC DERMATITIS: ICD-10-CM

## 2024-10-08 DIAGNOSIS — Z85.828 HISTORY OF NONMELANOMA SKIN CANCER: ICD-10-CM

## 2024-10-08 DIAGNOSIS — L57.0 AK (ACTINIC KERATOSIS): ICD-10-CM

## 2024-10-08 DIAGNOSIS — D22.9 MULTIPLE NEVI: ICD-10-CM

## 2024-10-08 DIAGNOSIS — L81.4 LENTIGINES: ICD-10-CM

## 2024-10-08 DIAGNOSIS — B07.0 PLANTAR WART OF LEFT FOOT: ICD-10-CM

## 2024-10-08 PROCEDURE — 99213 OFFICE O/P EST LOW 20 MIN: CPT | Performed by: DERMATOLOGY

## 2024-10-08 PROCEDURE — 1123F ACP DISCUSS/DSCN MKR DOCD: CPT | Performed by: DERMATOLOGY

## 2024-10-08 PROCEDURE — 17004 DESTROY PREMAL LESIONS 15/>: CPT | Performed by: DERMATOLOGY

## 2024-10-08 PROCEDURE — 11102 TANGNTL BX SKIN SINGLE LES: CPT | Performed by: DERMATOLOGY

## 2024-10-08 RX ORDER — METOPROLOL SUCCINATE 25 MG/1
25 TABLET, EXTENDED RELEASE ORAL DAILY
COMMUNITY

## 2024-10-08 NOTE — PROGRESS NOTES
[facility]  Alexsandra Clinton MD  [phone]      Katya Herron  1950    74 y.o. female     Date of Visit: 10/8/2024    Chief Complaint: f/u melanoma, lesions  Chief Complaint   Patient presents with    Skin Exam     Last seen: 9-2022    *heart stent 3-2019 and on plavix    History of Present Illness:  *living in [facility] [place] full time now; going back to [place] Oct; sees a derm -  Dr. Macias - in [place]    1. hx of MM on the posterior neck (see below) - here for full skin check; no new lesions or concerns with scar/site; no LAD, no HA, no F/C/NS.   Followed with Dr. Godwin as well but he released her after her 6-2016 visit - had imaging done - PET and MRI negative for mets 6-2016.  + family hx of melanoma (brother and sister)    2. Mult nevi - no other new concerns or changing lesions   3. Hx of NMSC - no concerns with previous sites.    Most recently - L upper back - site A - nod BCC- excision     4.  She has scattered rough lesions on the R hand and L hand and shin, face.    5. She has a persistent concerning areas on the nose -lesions on the dorsum and previously biopsied and read as AK.  She has a persistent pink shiny spot on the nasal dorsum that is concerning appearing.  Last biopsy was done in 2022.    6. F/u seb derm - dry itchy skin on the FH and near the brows.  Was given HC 2.5 in the past but hasn't used anything recently.  Most of her pruritic areas at recent visits are AK's.    7.  She has multiple plantar warts on the left foot.  Increasing in number since last seen but asymptomatic.    Status post shave removal of pigmented SK on the right forehead at 2020 visit.    Dermatology History   hx MM, posterior neck. 4.5 mm at least, level IV, ulceration identified. Deep margin involved, mitotic index 10/mm2.   No clinical evidence of regional or distant metastatic disease.  Nodes negative, stage IIb.  Excised 2-2012 by Dr. Godwin  8-2012 imaging negative, PET and MRI neg

## 2024-10-08 NOTE — PATIENT INSTRUCTIONS

## 2024-10-11 LAB — DERMATOLOGY PATHOLOGY REPORT: ABNORMAL

## 2025-06-24 ENCOUNTER — TELEPHONE (OUTPATIENT)
Age: 75
End: 2025-06-24

## 2025-06-24 NOTE — TELEPHONE ENCOUNTER
[facility] op note received from dermatologist in [place] regarding:    Treatment of nasal dorsum. Original biopsy by Dr. Clinton 10/8/2024.

## (undated) DEVICE — Device

## (undated) DEVICE — SYRINGE 30CC LUER LOCK: Brand: CARDINAL HEALTH

## (undated) DEVICE — SYSTEM FLD MGMT DIA0.9MM 45DEG ULT BAL VISN ACT INTREPID

## (undated) DEVICE — SOLUTION IV IRRIG WATER 500ML POUR BRL ST 2F7113

## (undated) DEVICE — 1 ML TUBERCULIN SYRINGE REGULAR TIP: Brand: MONOJECT

## (undated) DEVICE — SOLUTION IRRIG BSS ST 500ML

## (undated) DEVICE — MICROSURGICAL INSTRUMENT "J" SHAPED CANNULA 27GA: Brand: ALCON

## (undated) DEVICE — GLOVE SURG SZ 7.5 L11.73IN FNGR THK9.8MIL STRW LTX POLYMER

## (undated) DEVICE — SOLUTION IRRIGATION BAL SALT SOLUTION 15 ML STRL BSS

## (undated) DEVICE — STOPCOCK IV PRIMING 0.26ML 3 W W/ TWO FEM LUERLOK PRT 1